# Patient Record
Sex: MALE | Race: WHITE | HISPANIC OR LATINO | Employment: OTHER | URBAN - METROPOLITAN AREA
[De-identification: names, ages, dates, MRNs, and addresses within clinical notes are randomized per-mention and may not be internally consistent; named-entity substitution may affect disease eponyms.]

---

## 2022-03-07 ENCOUNTER — COMPREHENSIVE EXAM (OUTPATIENT)
Dept: URBAN - METROPOLITAN AREA CLINIC 32 | Facility: CLINIC | Age: 27
End: 2022-03-07

## 2022-03-07 DIAGNOSIS — H52.13: ICD-10-CM

## 2022-03-07 PROCEDURE — 92015 DETERMINE REFRACTIVE STATE: CPT

## 2022-03-07 PROCEDURE — 92310-3 LEVEL 3 CONTACT LENS MANAGEMENT

## 2022-03-07 PROCEDURE — 92004 COMPRE OPH EXAM NEW PT 1/>: CPT

## 2022-03-07 ASSESSMENT — KERATOMETRY
OD_K2POWER_DIOPTERS: 44.00
OD_AXISANGLE2_DEGREES: 75
OS_K2POWER_DIOPTERS: 45.25
OD_AXISANGLE_DEGREES: 165
OD_K1POWER_DIOPTERS: 43.50
OS_K1POWER_DIOPTERS: 44.25
OS_AXISANGLE2_DEGREES: 65
OS_AXISANGLE_DEGREES: 155

## 2022-03-07 ASSESSMENT — VISUAL ACUITY
OS_CC: J1
OD_CC: 20/40-2
OS_CC: 20/40-1
OU_CC: 20/40+1
OD_CC: J1
OU_CC: J1

## 2022-03-07 ASSESSMENT — TONOMETRY
OS_IOP_MMHG: 19
OD_IOP_MMHG: 22

## 2022-07-25 NOTE — PATIENT DISCUSSION
Glasses Prescription given to patient.
Instructed to call immediately if any new distortion, blurring, decreased vision or eye pain.
No retinal holes, tears, or detachment at this time.
Patient considering enhancement.
Patient made aware of 24/7 emergency services.
Patient understands condition, prognosis and need for follow up care.
Reassured patient.
Recommended observation.
Regression.
Detail Level: Zone